# Patient Record
Sex: MALE | Race: WHITE | NOT HISPANIC OR LATINO
[De-identification: names, ages, dates, MRNs, and addresses within clinical notes are randomized per-mention and may not be internally consistent; named-entity substitution may affect disease eponyms.]

---

## 2019-04-03 NOTE — PATIENT DISCUSSION
"""S/P DSEK OS. Graft attached and clearing. Continue post - op drops."" ""Stop ofloxacin left eye three times a day ."" ""Continue Durezol left eye twice a day ."" ""Start Melinda 128 Drops left eye twice a day . """

## 2019-06-10 NOTE — PATIENT DISCUSSION
"""S/P DSEK OD s/p rebubbling and repositioning in office on Friday. .Graft attached and clearing.  ""

## 2021-06-17 ENCOUNTER — PREPPED CHART (OUTPATIENT)
Dept: URBAN - METROPOLITAN AREA CLINIC 48 | Facility: CLINIC | Age: 76
End: 2021-06-17

## 2021-06-25 ENCOUNTER — NEW PATIENT COMPREHENSIVE (OUTPATIENT)
Dept: URBAN - METROPOLITAN AREA CLINIC 48 | Facility: CLINIC | Age: 76
End: 2021-06-25

## 2021-06-25 DIAGNOSIS — H35.372: ICD-10-CM

## 2021-06-25 DIAGNOSIS — H43.813: ICD-10-CM

## 2021-06-25 DIAGNOSIS — H52.03: ICD-10-CM

## 2021-06-25 DIAGNOSIS — H25.813: ICD-10-CM

## 2021-06-25 PROCEDURE — 92015 DETERMINE REFRACTIVE STATE: CPT

## 2021-06-25 PROCEDURE — 92004 COMPRE OPH EXAM NEW PT 1/>: CPT

## 2021-06-25 PROCEDURE — 92134 CPTRZ OPH DX IMG PST SGM RTA: CPT

## 2021-06-25 ASSESSMENT — VISUAL ACUITY
OS_CC: 20/60
OU_CC: 20/60
OU_CC: J2
OD_CC: 20/70

## 2021-06-25 ASSESSMENT — TONOMETRY
OD_IOP_MMHG: 16
OS_IOP_MMHG: 16

## 2021-07-02 ENCOUNTER — BIOMETRY (OUTPATIENT)
Dept: URBAN - METROPOLITAN AREA CLINIC 49 | Facility: CLINIC | Age: 76
End: 2021-07-02

## 2021-07-02 DIAGNOSIS — H25.813: ICD-10-CM

## 2021-07-02 PROCEDURE — 92136 OPHTHALMIC BIOMETRY: CPT

## 2021-07-02 PROCEDURE — TOPOIOL CORNEAL TOPOGRAPHY-PREMIUM IOL

## 2021-07-02 ASSESSMENT — KERATOMETRY
OS_AXISANGLE_DEGREES: 8
OD_K2POWER_DIOPTERS: 39.25
OS_AXISANGLE2_DEGREES: 098
OS_K2POWER_DIOPTERS: 39.25
OD_K1POWER_DIOPTERS: 40.12
OD_AXISANGLE2_DEGREES: 82
OS_K1POWER_DIOPTERS: 39.75
OD_AXISANGLE_DEGREES: 172

## 2021-07-09 ENCOUNTER — PRE OP - CE/IOL OD (OUTPATIENT)
Dept: URBAN - METROPOLITAN AREA CLINIC 48 | Facility: CLINIC | Age: 76
End: 2021-07-09

## 2021-07-09 DIAGNOSIS — H25.811: ICD-10-CM

## 2021-07-09 PROCEDURE — PREOP PRE OP VISIT

## 2021-07-09 ASSESSMENT — KERATOMETRY
OD_K2POWER_DIOPTERS: 39.25
OD_AXISANGLE_DEGREES: 172
OD_K1POWER_DIOPTERS: 40.12
OS_AXISANGLE2_DEGREES: 098
OS_AXISANGLE_DEGREES: 8
OS_K1POWER_DIOPTERS: 39.75
OD_AXISANGLE2_DEGREES: 82
OS_K2POWER_DIOPTERS: 39.25

## 2021-07-09 ASSESSMENT — VISUAL ACUITY
OD_CC: 20/60
OS_CC: 20/60
OU_CC: 20/50-2

## 2021-07-09 ASSESSMENT — TONOMETRY
OD_IOP_MMHG: 15
OS_IOP_MMHG: 15

## 2021-07-09 NOTE — PATIENT DISCUSSION
HIGH ASTIGMATISM- +FEMTO/NO TORIC: Discussed with patient that some astigmatism will be corrected with the femtosecond laser but that a toric lens implant will be needed to correct full astigmatism and that glasses or contact lenses may still be needed full time after cataract surgery.

## 2021-07-09 NOTE — PATIENT DISCUSSION
CATARACT SURGERY PLANNER - STANDARD IOL/+FEMTO: Phacoemulsification with IOL: Eye: OD|DOS: 7/15/2021|Model: DIBOO|Power: 23.5 VS 24.0 (ORA)|Target: PLANO|Femto: YES|Arcs: 50° @ 0° ; 50° @ 180°|Visc: DUET|Omidria: YES|10% Phenylephrine: YES|Epi-shugarcaine: YES|Phaco Setting: DENSE|BSS+: NO|Trypan Blue: NO|CTR: NO|Olive Tip: +/-|Atropine: NO|Pupilloplasty: NO|Notes: Plan: Eyhance w/Femto @ [facility]. Hx: very faint ERM OS. DILATES to 6-7mm.  Petar

## 2021-07-15 ENCOUNTER — SAME DAY PO - CE/IOL (OUTPATIENT)
Dept: URBAN - METROPOLITAN AREA CLINIC 48 | Facility: CLINIC | Age: 76
End: 2021-07-15

## 2021-07-15 ENCOUNTER — SURGERY/PROCEDURE (OUTPATIENT)
Dept: URBAN - METROPOLITAN AREA SURGERY 16 | Facility: SURGERY | Age: 76
End: 2021-07-15

## 2021-07-15 DIAGNOSIS — Z98.41: ICD-10-CM

## 2021-07-15 DIAGNOSIS — Z96.1: ICD-10-CM

## 2021-07-15 DIAGNOSIS — H25.811: ICD-10-CM

## 2021-07-15 PROCEDURE — 66984 XCAPSL CTRC RMVL W/O ECP: CPT

## 2021-07-15 PROCEDURE — 99024 POSTOP FOLLOW-UP VISIT: CPT

## 2021-07-15 PROCEDURE — DIB00FEMTO EYHANCE MONOFOCAL IOL WITH FEMTO

## 2021-07-15 ASSESSMENT — KERATOMETRY
OS_AXISANGLE_DEGREES: 8
OD_K2POWER_DIOPTERS: 39.25
OD_K1POWER_DIOPTERS: 40.12
OS_AXISANGLE_DEGREES: 8
OS_K1POWER_DIOPTERS: 39.75
OS_K2POWER_DIOPTERS: 39.25
OD_AXISANGLE_DEGREES: 172
OS_AXISANGLE2_DEGREES: 098
OD_K1POWER_DIOPTERS: 40.12
OS_K2POWER_DIOPTERS: 39.25
OD_AXISANGLE_DEGREES: 172
OD_AXISANGLE2_DEGREES: 82
OD_K2POWER_DIOPTERS: 39.25
OS_K1POWER_DIOPTERS: 39.75
OD_AXISANGLE2_DEGREES: 82
OS_AXISANGLE2_DEGREES: 098

## 2021-07-15 ASSESSMENT — VISUAL ACUITY: OD_SC: 20/70

## 2021-07-15 ASSESSMENT — TONOMETRY: OD_IOP_MMHG: 20

## 2021-07-15 NOTE — PATIENT DISCUSSION
CATARACT SURGERY PLANNER - STANDARD IOL/+FEMTO: Phacoemulsification with IOL: Eye: OD|DOS: 7/15/2021|Model: DIBOO|Power: 23.5 VS 24.0 (ORA)|Target: PLANO|Femto: YES|Arcs: 50° @ 0° ; 50° @ 180°|Visc: DUET|Omidria: YES|10% Phenylephrine: YES|Epi-shugarcaine: YES|Phaco Setting: DENSE|BSS+: NO|Trypan Blue: NO|CTR: NO|Olive Tip: +/-|Atropine: NO|Pupilloplasty: NO|Notes: Plan: Eyhance w/Femto @ [place] [facility]. Hx: very faint ERM OS. DILATES to 6-7mm. Kadeem Antoine

## 2021-07-23 ENCOUNTER — PRE OP - CE/IOL OS / 1 WEEK PO OD (OUTPATIENT)
Dept: URBAN - METROPOLITAN AREA CLINIC 48 | Facility: CLINIC | Age: 76
End: 2021-07-23

## 2021-07-23 DIAGNOSIS — H25.812: ICD-10-CM

## 2021-07-23 DIAGNOSIS — Z96.1: ICD-10-CM

## 2021-07-23 PROCEDURE — PREOP PRE OP VISIT

## 2021-07-23 PROCEDURE — 92136 - 2N OPHTHALMIC BIOMETRY BY PARTIAL COHERENCE INTERFEROMETRY WITH INTRAOCULAR LENS POWER CALCULATION

## 2021-07-23 RX ORDER — SODIUM CHLORIDE 50 MG/G: OINTMENT OPHTHALMIC EVERY EVENING

## 2021-07-23 ASSESSMENT — KERATOMETRY
OD_AXISANGLE2_DEGREES: 82
OD_AXISANGLE_DEGREES: 172
OS_K2POWER_DIOPTERS: 39.25
OS_K1POWER_DIOPTERS: 39.75
OD_K1POWER_DIOPTERS: 40.12
OD_K2POWER_DIOPTERS: 39.25
OS_AXISANGLE_DEGREES: 8
OS_AXISANGLE2_DEGREES: 098

## 2021-07-23 ASSESSMENT — TONOMETRY
OS_IOP_MMHG: 17
OD_IOP_MMHG: 17

## 2021-07-23 ASSESSMENT — VISUAL ACUITY
OD_SC: J3
OD_PH: 20/40+2
OS_SC: 20/50
OD_SC: 20/40-2

## 2021-07-23 NOTE — PATIENT DISCUSSION
CATARACT SURGERY PLANNER - STANDARD IOL/+FEMTO: Phacoemulsification with IOL: Eye: OS|DOS: 7/29/21|Model: DIBOO|Power: 23. 5|Target: PLANO|Femto: YES|Arcs: 30 @ 10 ; 30 @ 190|Visc: DUET|Omidria: YES|10% Phenylephrine: YES|Epi-shugarcaine: YES|Phaco Setting: STD|BSS+: NO|Trypan Blue: NO|CTR: NO|Olive Tip: NO|Atropine: NO|Pupilloplasty: NO|Notes: PLAN: DIBOO @ [facility]; HX VERY FAINT ERM OS. DILATED PUPIL: 6-7MM.

## 2021-07-29 ENCOUNTER — SURGERY/PROCEDURE (OUTPATIENT)
Dept: URBAN - METROPOLITAN AREA SURGERY 16 | Facility: SURGERY | Age: 76
End: 2021-07-29

## 2021-07-29 ENCOUNTER — SAME DAY PO - CE/IOL (OUTPATIENT)
Dept: URBAN - METROPOLITAN AREA CLINIC 48 | Facility: CLINIC | Age: 76
End: 2021-07-29

## 2021-07-29 DIAGNOSIS — H25.812: ICD-10-CM

## 2021-07-29 DIAGNOSIS — Z98.42: ICD-10-CM

## 2021-07-29 DIAGNOSIS — Z96.1: ICD-10-CM

## 2021-07-29 PROCEDURE — 66984 XCAPSL CTRC RMVL W/O ECP: CPT

## 2021-07-29 PROCEDURE — DIB00FEMTO EYHANCE MONOFOCAL IOL WITH FEMTO

## 2021-07-29 PROCEDURE — 99024 POSTOP FOLLOW-UP VISIT: CPT

## 2021-07-29 ASSESSMENT — KERATOMETRY
OS_K1POWER_DIOPTERS: 39.75
OD_K2POWER_DIOPTERS: 39.25
OS_AXISANGLE2_DEGREES: 098
OS_AXISANGLE2_DEGREES: 098
OD_AXISANGLE2_DEGREES: 82
OD_AXISANGLE2_DEGREES: 82
OS_AXISANGLE_DEGREES: 8
OS_AXISANGLE_DEGREES: 8
OD_K1POWER_DIOPTERS: 40.12
OD_K2POWER_DIOPTERS: 39.25
OS_K2POWER_DIOPTERS: 39.25
OD_AXISANGLE_DEGREES: 172
OD_K1POWER_DIOPTERS: 40.12
OS_K1POWER_DIOPTERS: 39.75
OD_AXISANGLE_DEGREES: 172
OS_K2POWER_DIOPTERS: 39.25

## 2021-07-29 ASSESSMENT — VISUAL ACUITY: OS_SC: 20/200

## 2021-07-29 ASSESSMENT — TONOMETRY: OS_IOP_MMHG: 26

## 2021-08-12 ENCOUNTER — 1 WEEK PO - CE/IOL (OUTPATIENT)
Dept: URBAN - METROPOLITAN AREA CLINIC 48 | Facility: CLINIC | Age: 76
End: 2021-08-12

## 2021-08-12 DIAGNOSIS — Z96.1: ICD-10-CM

## 2021-08-12 DIAGNOSIS — H35.372: ICD-10-CM

## 2021-08-12 DIAGNOSIS — Z98.42: ICD-10-CM

## 2021-08-12 PROCEDURE — 92134 CPTRZ OPH DX IMG PST SGM RTA: CPT

## 2021-08-12 PROCEDURE — 99024 POSTOP FOLLOW-UP VISIT: CPT

## 2021-08-12 ASSESSMENT — VISUAL ACUITY
OD_SC: 20/40
OS_SC: 20/30-1

## 2021-08-12 ASSESSMENT — KERATOMETRY
OD_AXISANGLE_DEGREES: 172
OD_K2POWER_DIOPTERS: 39.25
OD_AXISANGLE2_DEGREES: 82
OS_K2POWER_DIOPTERS: 39.25
OD_K1POWER_DIOPTERS: 40.12
OS_AXISANGLE_DEGREES: 8
OS_K1POWER_DIOPTERS: 39.75
OS_AXISANGLE2_DEGREES: 098

## 2021-08-12 ASSESSMENT — TONOMETRY
OS_IOP_MMHG: 18
OD_IOP_MMHG: 18

## 2021-08-26 ENCOUNTER — 4 WEEK PO - CE/IOL (OUTPATIENT)
Dept: URBAN - METROPOLITAN AREA CLINIC 48 | Facility: CLINIC | Age: 76
End: 2021-08-26

## 2021-08-26 DIAGNOSIS — Z98.42: ICD-10-CM

## 2021-08-26 PROCEDURE — 99024 POSTOP FOLLOW-UP VISIT: CPT

## 2021-08-26 ASSESSMENT — KERATOMETRY
OD_AXISANGLE_DEGREES: 172
OD_AXISANGLE2_DEGREES: 82
OS_AXISANGLE_DEGREES: 8
OD_K2POWER_DIOPTERS: 39.25
OS_K1POWER_DIOPTERS: 39.75
OS_K2POWER_DIOPTERS: 39.25
OS_AXISANGLE2_DEGREES: 098
OD_K1POWER_DIOPTERS: 40.12

## 2021-08-26 ASSESSMENT — VISUAL ACUITY
OD_SC: 20/30
OS_SC: 20/30
OU_SC: 20/30
OS_SC: 20/30-2
OU_SC: 20/20
OS_SC: J2
OU_SC: J1+
OD_SC: 20/20
OD_SC: J1+

## 2021-08-26 ASSESSMENT — TONOMETRY
OD_IOP_MMHG: 12
OS_IOP_MMHG: 14

## 2021-12-02 ENCOUNTER — 4 MONTH FOLLOW-UP (OUTPATIENT)
Dept: URBAN - METROPOLITAN AREA CLINIC 48 | Facility: CLINIC | Age: 76
End: 2021-12-02

## 2021-12-02 DIAGNOSIS — H26.493: ICD-10-CM

## 2021-12-02 DIAGNOSIS — H35.372: ICD-10-CM

## 2021-12-02 DIAGNOSIS — H40.013: ICD-10-CM

## 2021-12-02 DIAGNOSIS — Z96.1: ICD-10-CM

## 2021-12-02 DIAGNOSIS — H43.813: ICD-10-CM

## 2021-12-02 PROCEDURE — 92014 COMPRE OPH EXAM EST PT 1/>: CPT

## 2021-12-02 PROCEDURE — 92133 CPTRZD OPH DX IMG PST SGM ON: CPT

## 2021-12-02 ASSESSMENT — TONOMETRY
OD_IOP_MMHG: 12
OS_IOP_MMHG: 13

## 2021-12-02 ASSESSMENT — VISUAL ACUITY
OS_GLARE: 20/30
OD_GLARE: 20/40
OD_GLARE: 20/40
OD_PH: 20/30-2
OD_SC: 20/40-2
OU_CC: J1+
OS_SC: 20/30-2
OS_GLARE: 20/40

## 2021-12-02 ASSESSMENT — KERATOMETRY
OS_K1POWER_DIOPTERS: 39.75
OS_AXISANGLE2_DEGREES: 098
OD_K1POWER_DIOPTERS: 40.12
OD_AXISANGLE_DEGREES: 172
OS_K2POWER_DIOPTERS: 39.25
OD_AXISANGLE2_DEGREES: 82
OS_AXISANGLE_DEGREES: 8
OD_K2POWER_DIOPTERS: 39.25

## 2021-12-16 NOTE — PATIENT DISCUSSION
VISUALLY SIGNIFICANT: Informed patient that their cataract is visually significant and that surgery is recommended to improve patient's visual acuity and/or glare symptoms. RBAs of surgery discussed and questions answered. Patient to schedule biometry measurements and surgery. Ambulatory

## 2021-12-21 ENCOUNTER — CLINIC PROCEDURE ONLY (OUTPATIENT)
Dept: URBAN - METROPOLITAN AREA CLINIC 49 | Facility: CLINIC | Age: 76
End: 2021-12-21

## 2021-12-21 DIAGNOSIS — H26.491: ICD-10-CM

## 2021-12-21 PROCEDURE — 66821 AFTER CATARACT LASER SURGERY: CPT

## 2021-12-21 RX ORDER — PREDNISOLONE ACETATE 10 MG/ML: 1 SUSPENSION/ DROPS OPHTHALMIC

## 2021-12-21 ASSESSMENT — VISUAL ACUITY
OD_GLARE: 20/40
OD_SC: 20/40
OS_GLARE: 20/40
OS_SC: 20/30
OU_SC: 20/30
OD_PH: 20/30

## 2021-12-21 ASSESSMENT — TONOMETRY
OS_IOP_MMHG: 13
OD_IOP_MMHG: 13

## 2021-12-21 NOTE — PATIENT DISCUSSION
Yag Cap performed today with signed consent. Start Prednisolone Acetate 1% BID x 1 week, then QD x 1 week, then discontinue.

## 2021-12-21 NOTE — PROCEDURE NOTE: CLINICAL
PROCEDURE NOTE: YAG Capsulotomy OD. Diagnosis: Posterior Capsular Opacification (PCO). Prep: Mydriacil 1% and Phenylephrine 2.5%. Prior to treatment, the risks/benefits/alternatives were discussed. The patient wished to proceed with procedure. Consent was signed. Proparacaine and brominidine were placed into the operative eye after the eye was dilated. Power = 1.4mJ. Number of pulses = 29. Patient tolerated procedure well and there were no complications. Post Laser instructions given. Emily Malin

## 2021-12-28 ENCOUNTER — CLINIC PROCEDURE ONLY (OUTPATIENT)
Dept: URBAN - METROPOLITAN AREA CLINIC 49 | Facility: CLINIC | Age: 76
End: 2021-12-28

## 2021-12-28 DIAGNOSIS — H26.492: ICD-10-CM

## 2021-12-28 PROCEDURE — 66821 AFTER CATARACT LASER SURGERY: CPT

## 2021-12-28 RX ORDER — PREDNISOLONE ACETATE 10 MG/ML: 1 SUSPENSION/ DROPS OPHTHALMIC TWICE A DAY

## 2021-12-28 ASSESSMENT — TONOMETRY
OD_IOP_MMHG: 13
OS_IOP_MMHG: 14

## 2021-12-28 ASSESSMENT — KERATOMETRY
OS_K1POWER_DIOPTERS: 39.75
OD_K2POWER_DIOPTERS: 39.25
OS_AXISANGLE_DEGREES: 8
OS_AXISANGLE2_DEGREES: 098
OD_K1POWER_DIOPTERS: 40.12
OS_K2POWER_DIOPTERS: 39.25
OD_AXISANGLE2_DEGREES: 82
OD_AXISANGLE_DEGREES: 172

## 2021-12-28 ASSESSMENT — VISUAL ACUITY
OS_PH: 20/25
OD_SC: 20/25-1
OU_SC: 20/25
OS_SC: 20/30

## 2021-12-28 NOTE — PROCEDURE NOTE: CLINICAL
PROCEDURE NOTE: YAG Capsulotomy OS. Diagnosis: Posterior Capsular Opacification (PCO). Prep: Mydriacil 1% and Phenylephrine 2.5%. Prior to treatment, the risks/benefits/alternatives were discussed. The patient wished to proceed with procedure. Consent was signed. Proparacaine and brominidine were placed into the operative eye after the eye was dilated. Power = 1.5mJ. Number of pulses = 52. Patient tolerated procedure well and there were no complications. Post Laser instructions given. Ivanna Camara

## 2022-01-26 ENCOUNTER — ESTABLISHED PATIENT (OUTPATIENT)
Dept: URBAN - METROPOLITAN AREA CLINIC 48 | Facility: CLINIC | Age: 77
End: 2022-01-26

## 2022-01-26 DIAGNOSIS — Z98.890: ICD-10-CM

## 2022-01-26 PROCEDURE — 99024 POSTOP FOLLOW-UP VISIT: CPT

## 2022-01-26 ASSESSMENT — VISUAL ACUITY
OS_SC: 20/25-2
OD_SC: 20/30-2
OD_PH: 20/25-2

## 2022-01-26 ASSESSMENT — KERATOMETRY
OD_K2POWER_DIOPTERS: 39.25
OD_K1POWER_DIOPTERS: 40.12
OS_AXISANGLE_DEGREES: 8
OD_AXISANGLE_DEGREES: 172
OS_K1POWER_DIOPTERS: 39.75
OS_K2POWER_DIOPTERS: 39.25
OS_AXISANGLE2_DEGREES: 098
OD_AXISANGLE2_DEGREES: 82

## 2022-01-26 ASSESSMENT — TONOMETRY
OD_IOP_MMHG: 13
OS_IOP_MMHG: 13

## 2022-02-23 NOTE — PATIENT DISCUSSION
Graft failure OD.  Diffuse MCE and edema in graft.  Vision poor OD.  Plan Repeat DSEK OD.  R/b/a d/w pt. including infection, graft failure, graft rejection, and graft detachment.  Pt. elects to proceed.

## 2022-02-23 NOTE — PATIENT DISCUSSION
D/c Pred Acetate. Start Combigan BID OD and Start Flarex once a day daily OD. Samples given today in office.

## 2023-04-25 ENCOUNTER — COMPREHENSIVE EXAM (OUTPATIENT)
Dept: URBAN - METROPOLITAN AREA CLINIC 48 | Facility: LOCATION | Age: 78
End: 2023-04-25

## 2023-04-25 DIAGNOSIS — H35.372: ICD-10-CM

## 2023-04-25 DIAGNOSIS — H40.013: ICD-10-CM

## 2023-04-25 DIAGNOSIS — H43.813: ICD-10-CM

## 2023-04-25 PROCEDURE — 92014 COMPRE OPH EXAM EST PT 1/>: CPT

## 2023-04-25 PROCEDURE — 92134 CPTRZ OPH DX IMG PST SGM RTA: CPT

## 2023-04-25 ASSESSMENT — VISUAL ACUITY
OD_PH: 20/30+0
OU_SC: J1
OS_SC: 20/25-1
OU_SC: 20/25
OD_SC: 20/30-2

## 2023-04-25 ASSESSMENT — KERATOMETRY
OD_K1POWER_DIOPTERS: 40.12
OD_AXISANGLE2_DEGREES: 82
OS_AXISANGLE2_DEGREES: 098
OS_K2POWER_DIOPTERS: 39.25
OS_AXISANGLE_DEGREES: 8
OD_K2POWER_DIOPTERS: 39.25
OS_K1POWER_DIOPTERS: 39.75
OD_AXISANGLE_DEGREES: 172

## 2023-04-25 ASSESSMENT — TONOMETRY
OS_IOP_MMHG: 12
OD_IOP_MMHG: 13